# Patient Record
Sex: MALE | Race: WHITE
[De-identification: names, ages, dates, MRNs, and addresses within clinical notes are randomized per-mention and may not be internally consistent; named-entity substitution may affect disease eponyms.]

---

## 2018-07-16 PROBLEM — Z00.00 ENCOUNTER FOR PREVENTIVE HEALTH EXAMINATION: Status: ACTIVE | Noted: 2018-07-16

## 2022-05-11 DIAGNOSIS — Z87.898 PERSONAL HISTORY OF OTHER SPECIFIED CONDITIONS: ICD-10-CM

## 2022-05-11 DIAGNOSIS — Z78.9 OTHER SPECIFIED HEALTH STATUS: ICD-10-CM

## 2022-05-11 DIAGNOSIS — F17.200 NICOTINE DEPENDENCE, UNSPECIFIED, UNCOMPLICATED: ICD-10-CM

## 2022-05-12 ENCOUNTER — APPOINTMENT (OUTPATIENT)
Dept: ORTHOPEDIC SURGERY | Facility: CLINIC | Age: 52
End: 2022-05-12

## 2022-05-19 ENCOUNTER — APPOINTMENT (OUTPATIENT)
Dept: ORTHOPEDIC SURGERY | Facility: CLINIC | Age: 52
End: 2022-05-19

## 2022-05-26 ENCOUNTER — APPOINTMENT (OUTPATIENT)
Dept: ORTHOPEDIC SURGERY | Facility: CLINIC | Age: 52
End: 2022-05-26
Payer: OTHER MISCELLANEOUS

## 2022-05-26 VITALS — HEIGHT: 67 IN | WEIGHT: 150 LBS | BODY MASS INDEX: 23.54 KG/M2

## 2022-05-26 DIAGNOSIS — M19.012 PRIMARY OSTEOARTHRITIS, LEFT SHOULDER: ICD-10-CM

## 2022-05-26 DIAGNOSIS — M75.52 BURSITIS OF LEFT SHOULDER: ICD-10-CM

## 2022-05-26 DIAGNOSIS — Z78.9 OTHER SPECIFIED HEALTH STATUS: ICD-10-CM

## 2022-05-26 DIAGNOSIS — S46.012D STRAIN OF MUSCLE(S) AND TENDON(S) OF THE ROTATOR CUFF OF LEFT SHOULDER, SUBSEQUENT ENCOUNTER: ICD-10-CM

## 2022-05-26 PROCEDURE — 99455 WORK RELATED DISABILITY EXAM: CPT

## 2022-05-26 PROCEDURE — 99072 ADDL SUPL MATRL&STAF TM PHE: CPT

## 2022-05-26 NOTE — HISTORY OF PRESENT ILLNESS
[Work related] : work related [Gradual] : gradual [6] : 6 [3] : 3 [Sharp] : sharp [Constant] : constant [Household chores] : household chores [Leisure] : leisure [Work] : work [Sleep] : sleep [Rest] : rest [Full time] : Work status: full time [de-identified] : Patient is here for a follow up. Patient had left shoulder MRI viewing revealing mild to moderate subscapularis tendinosis with a small shallow articular sided tear, mild supraspinatus and infraspinatus tendinitis, moderate AC joint oa and mild subacromial bursitis on 10/18/2021. Patient is here for an SLU visit.  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

## 2022-05-26 NOTE — PHYSICAL EXAM
[Left] : left shoulder [NL (0-180)] : full active abduction 0-180 degrees [NL (0-70)] : full internal rotation 0-70 degrees [NL (0-90)] : full external rotation 0-90 degrees [Standing] : standing [Mild] : mild [] : negative David [FreeTextEntry9] : ROM is equal compared to contralateral side

## 2022-05-26 NOTE — ASSESSMENT
[FreeTextEntry1] : 51 yo male presenting today for SLU of left shoulder RTC tear, bursitis, ac joint djd.\par -Patient able to work full duty without restrictions at this time\par -Activities as tolerated\par -Rest, ice, NSAIDs PRN for pain\par -All questions answered\par -F/u PRN\par \par SLU 0%

## 2022-06-02 ENCOUNTER — APPOINTMENT (OUTPATIENT)
Dept: ORTHOPEDIC SURGERY | Facility: CLINIC | Age: 52
End: 2022-06-02

## 2022-12-06 ENCOUNTER — APPOINTMENT (OUTPATIENT)
Dept: ORTHOPEDIC SURGERY | Facility: CLINIC | Age: 52
End: 2022-12-06

## 2022-12-13 ENCOUNTER — APPOINTMENT (OUTPATIENT)
Dept: ORTHOPEDIC SURGERY | Facility: CLINIC | Age: 52
End: 2022-12-13

## 2023-07-20 ENCOUNTER — APPOINTMENT (OUTPATIENT)
Dept: ORTHOPEDIC SURGERY | Facility: CLINIC | Age: 53
End: 2023-07-20
Payer: COMMERCIAL

## 2023-07-20 DIAGNOSIS — M17.0 BILATERAL PRIMARY OSTEOARTHRITIS OF KNEE: ICD-10-CM

## 2023-07-20 PROCEDURE — 20610 DRAIN/INJ JOINT/BURSA W/O US: CPT | Mod: 50

## 2023-07-20 PROCEDURE — 99214 OFFICE O/P EST MOD 30 MIN: CPT | Mod: 25

## 2023-07-20 PROCEDURE — 73562 X-RAY EXAM OF KNEE 3: CPT | Mod: 50

## 2023-07-20 NOTE — ASSESSMENT
[FreeTextEntry1] : 52yo male with moderate bilateral knee oa.  Nonsurgical management. \par \par -bilat knee CSI administered in office without complications.\par -Activities as tolerated\par -Rest, ice, compression, elevation, NSAIDs PRN for pain. \par -All questions answered\par -F/u prn\par \par The diagnosis was explained in detail. The potential non-surgical and surgical treatments were reviewed. The relative risks and benefits of each option were considered relative to the patient’s age, activity level, medical history, symptom severity and previously attempted treatments.\par \par The patient was advised to consult with their primary medical provider prior to initiation of any new medications to reduce the risk of adverse effects specific to their long-term home medications and medical history. The risk of gastrointestinal irritation and kidney injury specific to long-term NSAID use was discussed.

## 2023-07-20 NOTE — HISTORY OF PRESENT ILLNESS
[Sudden] : sudden [10] : 10 [5] : 5 [Dull/Aching] : dull/aching [Sharp] : sharp [Throbbing] : throbbing [Constant] : constant [Household chores] : household chores [Leisure] : leisure [Social interactions] : social interactions [Rest] : rest [Full time] : Work status: full time [de-identified] : Patient is here for bilat knees. Patient states NKI. Patient states NKI. Patient states pain started 6 months ago. Patient states left knee hurts more than right. Patient states DR. Shirley did SX on the left knee and Dr. Card did SX on the right knee. Patient states CSI was many years ago. Patient states the pain is a constant ache and sharp with weight bearing. Patient states he feels like his knee is going to give out on him. Patient would like bilst CSI today.  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

## 2023-07-20 NOTE — PHYSICAL EXAM
[de-identified] : Examination of the right and left knees is as follows:\par INSPECTION: varus alignment, mild effusion, but no ecchymosis, no erythema, no atrophy, no deformities of quad tendon or of patellar tendon\par PALPATION: medial joint line tenderness, but no palpable mass, no obvious defects, no increased warmth, no calf tenderness\par ROM: flexion 125 degrees, but extension 0 degrees\par STRENGTH: quadriceps 5/5, hamstring 5/5\par TESTING: ligamentously stable\par NEURO: light touch is intact throughout\par GAIT: antalgic, but patient ambulates without assistive device\par \par X-rays of the right and left knees is as follows: \par Knee 3 view in the anteroposterior, lateral, skyline views: moderate tricompartmental OA with medial joint space narrowing and varus alignment

## 2023-07-20 NOTE — PROCEDURE
[Large Joint Injection] : Large joint injection [Bilateral] : bilaterally of the [Knee] : knee [Pain] : pain [Inflammation] : inflammation [X-ray evidence of Osteoarthritis on this or prior visit] : x-ray evidence of Osteoarthritis on this or prior visit [Alcohol] : alcohol [___ cc    3mg] :  Betamethasone (Celestone) ~Vcc of 3mg [___ cc    1%] : Lidocaine ~Vcc of 1%  [Call if redness, pain or fever occur] : call if redness, pain or fever occur [Previous OTC use and PT nontherapeutic] : patient has tried OTC's including aspirin, Ibuprofen, Aleve, etc or prescription NSAIDS, and/or exercises at home and/or physical therapy without satisfactory response [Patient had decreased mobility in the joint] : patient had decreased mobility in the joint [Risks, benefits, alternatives discussed / Verbal consent obtained] : the risks benefits, and alternatives have been discussed, and verbal consent was obtained